# Patient Record
Sex: FEMALE | Race: WHITE | NOT HISPANIC OR LATINO | Employment: FULL TIME | URBAN - METROPOLITAN AREA
[De-identification: names, ages, dates, MRNs, and addresses within clinical notes are randomized per-mention and may not be internally consistent; named-entity substitution may affect disease eponyms.]

---

## 2023-04-19 DIAGNOSIS — F41.9 ANXIETY: Primary | ICD-10-CM

## 2023-04-19 RX ORDER — ESCITALOPRAM OXALATE 10 MG/1
10 TABLET ORAL DAILY
COMMUNITY
Start: 2016-05-04 | End: 2023-04-19 | Stop reason: SDUPTHER

## 2023-04-20 RX ORDER — ESCITALOPRAM OXALATE 10 MG/1
10 TABLET ORAL DAILY
Qty: 90 TABLET | Refills: 3 | Status: SHIPPED | OUTPATIENT
Start: 2023-04-20 | End: 2023-08-14 | Stop reason: SDUPTHER

## 2023-06-19 ENCOUNTER — OFFICE VISIT (OUTPATIENT)
Dept: PRIMARY CARE | Facility: CLINIC | Age: 43
End: 2023-06-19
Payer: COMMERCIAL

## 2023-06-19 VITALS
TEMPERATURE: 97.3 F | SYSTOLIC BLOOD PRESSURE: 128 MMHG | BODY MASS INDEX: 38.26 KG/M2 | WEIGHT: 216 LBS | OXYGEN SATURATION: 98 % | HEART RATE: 73 BPM | DIASTOLIC BLOOD PRESSURE: 86 MMHG | RESPIRATION RATE: 18 BRPM

## 2023-06-19 DIAGNOSIS — F41.9 ANXIETY: ICD-10-CM

## 2023-06-19 DIAGNOSIS — E04.9 ENLARGED THYROID: ICD-10-CM

## 2023-06-19 DIAGNOSIS — K21.9 GASTROESOPHAGEAL REFLUX DISEASE WITHOUT ESOPHAGITIS: Primary | ICD-10-CM

## 2023-06-19 DIAGNOSIS — E78.5 DYSLIPIDEMIA: ICD-10-CM

## 2023-06-19 PROBLEM — R35.0 URINARY FREQUENCY: Status: ACTIVE | Noted: 2023-06-19

## 2023-06-19 PROBLEM — E55.9 VITAMIN D DEFICIENCY: Status: ACTIVE | Noted: 2023-06-19

## 2023-06-19 PROBLEM — L30.9 DERMATITIS: Status: ACTIVE | Noted: 2023-06-19

## 2023-06-19 PROBLEM — J32.9 SINUSITIS: Status: ACTIVE | Noted: 2023-06-19

## 2023-06-19 PROBLEM — D64.9 ANEMIA: Status: ACTIVE | Noted: 2023-06-19

## 2023-06-19 PROBLEM — H66.90 OTITIS MEDIA: Status: ACTIVE | Noted: 2023-06-19

## 2023-06-19 PROBLEM — O20.9 FIRST TRIMESTER BLEEDING (HHS-HCC): Status: ACTIVE | Noted: 2023-06-19

## 2023-06-19 PROBLEM — J30.2 SEASONAL ALLERGIC RHINITIS: Status: ACTIVE | Noted: 2023-06-19

## 2023-06-19 PROBLEM — M25.559 PAINFUL HIP: Status: ACTIVE | Noted: 2023-06-19

## 2023-06-19 PROBLEM — O09.521 ELDERLY MULTIGRAVIDA IN FIRST TRIMESTER (HHS-HCC): Status: ACTIVE | Noted: 2023-06-19

## 2023-06-19 PROBLEM — R31.9 HEMATURIA: Status: ACTIVE | Noted: 2023-06-19

## 2023-06-19 PROBLEM — H10.9 CONJUNCTIVITIS: Status: ACTIVE | Noted: 2023-06-19

## 2023-06-19 PROBLEM — R53.83 FATIGUE: Status: ACTIVE | Noted: 2023-06-19

## 2023-06-19 PROBLEM — B37.31 CANDIDA VAGINITIS: Status: ACTIVE | Noted: 2023-06-19

## 2023-06-19 PROBLEM — J01.90 ACUTE SINUSITIS: Status: ACTIVE | Noted: 2023-06-19

## 2023-06-19 PROCEDURE — 99213 OFFICE O/P EST LOW 20 MIN: CPT | Performed by: INTERNAL MEDICINE

## 2023-06-19 PROCEDURE — 1036F TOBACCO NON-USER: CPT | Performed by: INTERNAL MEDICINE

## 2023-06-19 RX ORDER — LEVOTHYROXINE SODIUM 50 UG/1
50 TABLET ORAL
COMMUNITY
End: 2023-06-19

## 2023-06-19 RX ORDER — LEVOTHYROXINE SODIUM 25 UG/1
25 TABLET ORAL
COMMUNITY
Start: 2023-06-15 | End: 2023-06-19

## 2023-06-19 RX ORDER — ESOMEPRAZOLE MAGNESIUM 40 MG/1
40 CAPSULE, DELAYED RELEASE ORAL
Qty: 90 CAPSULE | Refills: 1 | Status: SHIPPED | OUTPATIENT
Start: 2023-06-19 | End: 2023-10-06

## 2023-06-19 RX ORDER — ALPRAZOLAM 0.25 MG/1
1 TABLET ORAL DAILY
COMMUNITY

## 2023-06-19 RX ORDER — ESOMEPRAZOLE MAGNESIUM 40 MG/1
1 CAPSULE, DELAYED RELEASE ORAL DAILY
COMMUNITY
Start: 2020-09-08 | End: 2023-06-19

## 2023-06-19 RX ORDER — ASPIRIN 325 MG
1 TABLET, DELAYED RELEASE (ENTERIC COATED) ORAL
COMMUNITY
Start: 2019-02-19 | End: 2023-10-06 | Stop reason: SDUPTHER

## 2023-06-19 ASSESSMENT — PATIENT HEALTH QUESTIONNAIRE - PHQ9
1. LITTLE INTEREST OR PLEASURE IN DOING THINGS: NOT AT ALL
2. FEELING DOWN, DEPRESSED OR HOPELESS: NOT AT ALL
SUM OF ALL RESPONSES TO PHQ9 QUESTIONS 1 AND 2: 0

## 2023-06-19 ASSESSMENT — PAIN SCALES - GENERAL: PAINLEVEL: 0-NO PAIN

## 2023-06-19 NOTE — PROGRESS NOTES
Subjective   Patient ID: Maia eTjada is a 43 y.o. female who presents for Follow-up (TEST RESULTS, Request referrals ).    HPI Priya presents to the office for a scheduled visit previously when I had seen her she was having some issues with swallowing problems.  Assessed her physical exam which was consistent with thyromegaly we obtained a ultrasound of thyroid which was enlarged mildly she had seen a [facility] health physician who had initiated thyroid replacement therapy with normal thyroid function and normal antibodies which had tested before    She then saw endocrinology she was told that she should not be on any replacement as her was normal and she has no evidence of any autoimmune or Hashimoto's thyroiditis.  She can stop taking the medication when she is some difficulty with swallowing and dysphagia.    No GI symptoms and mood is good    Does not use Xanax much but she has some for very significant and serious panic issues which has not been the case for some time now    Otherwise no issues    Review of Systems 10 systems reviewed does not mention were negative    Objective   /86 (BP Location: Left arm, Patient Position: Sitting)   Pulse 73   Temp 36.3 °C (97.3 °F)   Resp 18   Wt 98 kg (216 lb)   SpO2 98%   BMI 38.26 kg/m²     Physical Exam HEENT normocephalic atraumatic pupils round and reactive no oropharyngeal exudate no thyromegaly  Carotid bruits absent  Cardiovascular regular rate and rhythm no murmurs  Respiratory bilateral breath sounds without rales or rhonchi or mitral chest expansion bilaterally  Abdomen soft nontender bowel sounds are present no organomegaly  Skin warm without any rashes  Musculoskeletal no digital clubbing or cyanosis normal gait no muscle atrophy  Neuro alert and oriented Ãƒ-3. Speech clear. Follows commands appropriately  Pulse normal carotid pulse normal radial pulse normal pedal pulses      Assessment/Plan   Problem List Items Addressed This Visit           Digestive    GERD (gastroesophageal reflux disease) - Primary    Relevant Medications    esomeprazole (NexIUM) 40 mg DR capsule    Other Relevant Orders    Referral to Gastroenterology       Endocrine/Metabolic    Enlarged thyroid       Other    Anxiety    Dyslipidemia     With regards to her thyromegaly she has an enlarged thyroid which is not consistent with any evidence of Hashimoto's thyroiditis again I have worked her up she was advised again to follow my advice and in the compliance advised on not initiating replacement of thyroid medication    Dysphagia should be evaluated by GI as well GERD may be an issue here I recommended Nexium for about the next maybe 4 weeks follow-up with GI also for possible upper esophageal stenosis    We discussed diet and activity and weight management for cholesterol assessment    Anxiety is very well controlled currently with SSRI and very infrequent need for alprazolam

## 2023-08-14 DIAGNOSIS — F41.9 ANXIETY: ICD-10-CM

## 2023-08-15 RX ORDER — ESCITALOPRAM OXALATE 10 MG/1
10 TABLET ORAL DAILY
Qty: 90 TABLET | Refills: 1 | Status: SHIPPED | OUTPATIENT
Start: 2023-08-15 | End: 2024-02-12

## 2023-09-15 ENCOUNTER — APPOINTMENT (OUTPATIENT)
Dept: PRIMARY CARE | Facility: CLINIC | Age: 43
End: 2023-09-15
Payer: COMMERCIAL

## 2023-09-19 PROBLEM — R92.8 ABNORMAL MAMMOGRAM OF LEFT BREAST: Status: ACTIVE | Noted: 2023-09-19

## 2023-09-19 PROBLEM — F33.42 MDD (MAJOR DEPRESSIVE DISORDER), RECURRENT, IN FULL REMISSION (CMS-HCC): Status: ACTIVE | Noted: 2023-09-19

## 2023-09-19 PROBLEM — F41.1 GAD (GENERALIZED ANXIETY DISORDER): Status: ACTIVE | Noted: 2023-09-19

## 2023-09-19 PROBLEM — R14.0 ABDOMINAL BLOATING: Status: ACTIVE | Noted: 2023-09-19

## 2023-09-19 PROBLEM — E66.812 OBESITY, CLASS II, BMI 35-39.9: Status: ACTIVE | Noted: 2021-05-04

## 2023-09-19 PROBLEM — E66.9 OBESITY, CLASS II, BMI 35-39.9: Status: ACTIVE | Noted: 2021-05-04

## 2023-09-19 PROBLEM — F17.200 SMOKING: Status: ACTIVE | Noted: 2023-09-19

## 2023-09-19 PROBLEM — R13.10 DYSPHAGIA: Status: ACTIVE | Noted: 2023-09-19

## 2023-09-19 RX ORDER — LEVOTHYROXINE SODIUM 25 UG/1
TABLET ORAL
COMMUNITY
Start: 2023-04-20 | End: 2023-10-06 | Stop reason: ALTCHOICE

## 2023-09-19 RX ORDER — IVERMECTIN 10 MG/G
CREAM TOPICAL
COMMUNITY
Start: 2021-04-26 | End: 2023-10-06 | Stop reason: ALTCHOICE

## 2023-09-19 RX ORDER — POLYETHYLENE GLYCOL 3350 17 G/17G
POWDER, FOR SOLUTION ORAL 2 TIMES DAILY
COMMUNITY
Start: 2023-08-07

## 2023-09-19 RX ORDER — ALBUTEROL SULFATE 90 UG/1
2 AEROSOL, METERED RESPIRATORY (INHALATION) EVERY 4 HOURS PRN
COMMUNITY
Start: 2021-08-18 | End: 2023-10-06 | Stop reason: ALTCHOICE

## 2023-09-19 RX ORDER — SIMETHICONE 125 MG
2 CAPSULE ORAL 4 TIMES DAILY PRN
COMMUNITY
Start: 2023-08-07

## 2023-09-19 RX ORDER — AMOXICILLIN 250 MG
2 CAPSULE ORAL DAILY
COMMUNITY
Start: 2023-08-07 | End: 2023-10-06 | Stop reason: SINTOL

## 2023-09-20 ENCOUNTER — TELEPHONE (OUTPATIENT)
Dept: PRIMARY CARE | Facility: CLINIC | Age: 43
End: 2023-09-20
Payer: COMMERCIAL

## 2023-09-20 NOTE — TELEPHONE ENCOUNTER
Called and notified patient of lab results and DR recommendations. Patient verbalized understanding.

## 2023-10-06 ENCOUNTER — OFFICE VISIT (OUTPATIENT)
Dept: PRIMARY CARE | Facility: CLINIC | Age: 43
End: 2023-10-06
Payer: COMMERCIAL

## 2023-10-06 VITALS
WEIGHT: 210 LBS | OXYGEN SATURATION: 96 % | SYSTOLIC BLOOD PRESSURE: 130 MMHG | BODY MASS INDEX: 37.21 KG/M2 | HEART RATE: 85 BPM | HEIGHT: 63 IN | DIASTOLIC BLOOD PRESSURE: 88 MMHG

## 2023-10-06 DIAGNOSIS — E04.9 ENLARGED THYROID: ICD-10-CM

## 2023-10-06 DIAGNOSIS — K21.9 GASTROESOPHAGEAL REFLUX DISEASE WITHOUT ESOPHAGITIS: ICD-10-CM

## 2023-10-06 DIAGNOSIS — E78.5 DYSLIPIDEMIA: ICD-10-CM

## 2023-10-06 DIAGNOSIS — E66.9 OBESITY, CLASS II, BMI 35-39.9: ICD-10-CM

## 2023-10-06 DIAGNOSIS — Z00.00 ENCOUNTER FOR MEDICAL EXAMINATION TO ESTABLISH CARE: Primary | ICD-10-CM

## 2023-10-06 DIAGNOSIS — Z00.00 HEALTH MAINTENANCE EXAMINATION: ICD-10-CM

## 2023-10-06 DIAGNOSIS — R73.9 HYPERGLYCEMIA: ICD-10-CM

## 2023-10-06 DIAGNOSIS — Z13.29 THYROID DISORDER SCREENING: ICD-10-CM

## 2023-10-06 DIAGNOSIS — E55.9 VITAMIN D DEFICIENCY: ICD-10-CM

## 2023-10-06 PROCEDURE — 1036F TOBACCO NON-USER: CPT | Performed by: STUDENT IN AN ORGANIZED HEALTH CARE EDUCATION/TRAINING PROGRAM

## 2023-10-06 PROCEDURE — 99213 OFFICE O/P EST LOW 20 MIN: CPT | Performed by: STUDENT IN AN ORGANIZED HEALTH CARE EDUCATION/TRAINING PROGRAM

## 2023-10-06 RX ORDER — PANTOPRAZOLE SODIUM 40 MG/1
40 TABLET, DELAYED RELEASE ORAL DAILY
Qty: 90 TABLET | Refills: 1 | Status: SHIPPED | OUTPATIENT
Start: 2023-10-06 | End: 2023-12-05

## 2023-10-06 RX ORDER — ASPIRIN 325 MG
50000 TABLET, DELAYED RELEASE (ENTERIC COATED) ORAL
Qty: 12 CAPSULE | Refills: 1 | Status: SHIPPED | OUTPATIENT
Start: 2023-10-06

## 2023-10-06 ASSESSMENT — ENCOUNTER SYMPTOMS
DIARRHEA: 1
CHEST TIGHTNESS: 0
FATIGUE: 0
NERVOUS/ANXIOUS: 0
NAUSEA: 0
TROUBLE SWALLOWING: 0
ABDOMINAL DISTENTION: 1
NUMBNESS: 0
WEAKNESS: 0
CHOKING: 0
POLYDIPSIA: 0
SLEEP DISTURBANCE: 0
COLOR CHANGE: 0
ACTIVITY CHANGE: 0
ABDOMINAL PAIN: 0
APPETITE CHANGE: 0
CONSTIPATION: 0
PALPITATIONS: 0
UNEXPECTED WEIGHT CHANGE: 0
VOMITING: 0
APNEA: 0
SORE THROAT: 0

## 2023-10-06 ASSESSMENT — PATIENT HEALTH QUESTIONNAIRE - PHQ9
2. FEELING DOWN, DEPRESSED OR HOPELESS: NOT AT ALL
1. LITTLE INTEREST OR PLEASURE IN DOING THINGS: NOT AT ALL
SUM OF ALL RESPONSES TO PHQ9 QUESTIONS 1 AND 2: 0

## 2023-10-06 NOTE — PROGRESS NOTES
Subjective   Patient ID: Maia Tejada is a 43 y.o. female who presents for [facility] (New patient est care- former [facility] patient).  HPI 43 YOF presenting to [facility], previous PCP retired.  She has no complaints today.  Was evaluated by endocrine due to an enlarged thyroid seen on US, but has normal TSH & T4.  Endocrine recommended no thyroid supplementation at this time, patient also denies any hypothyroid symptoms.  Is supposed to have EGD/colonoscopy end of October due to history of chronic diarrhea, GERD, and a hiatal hernia.  Currently takes OTC nexium BID.  Has made lifestyle changes including no nocturnal eating, decreased caffeine consumption, and elevating head of her bed while sleeping, but still gets severe GERD symptoms if not taking nexium.    Review of Systems   Constitutional:  Negative for activity change, appetite change, fatigue and unexpected weight change.   HENT:  Negative for sore throat and trouble swallowing.    Respiratory:  Negative for apnea, choking and chest tightness.    Cardiovascular:  Negative for chest pain and palpitations.   Gastrointestinal:  Positive for abdominal distention and diarrhea. Negative for abdominal pain, constipation, nausea and vomiting.   Endocrine: Negative for cold intolerance, heat intolerance, polydipsia and polyuria.   Skin:  Negative for color change and rash.   Neurological:  Negative for weakness and numbness.   Psychiatric/Behavioral:  Negative for sleep disturbance. The patient is not nervous/anxious.        Objective   Physical Exam  Constitutional:       Appearance: Normal appearance.   HENT:      Mouth/Throat:      Mouth: Mucous membranes are moist.   Eyes:      Pupils: Pupils are equal, round, and reactive to light.   Cardiovascular:      Rate and Rhythm: Normal rate and regular rhythm.   Pulmonary:      Breath sounds: No rhonchi.   Musculoskeletal:         General: Normal range of motion.   Skin:     General: Skin is warm.    Neurological:      General: No focal deficit present.      Mental Status: She is alert and oriented to person, place, and time.   Psychiatric:         Mood and Affect: Mood normal.         Behavior: Behavior normal.         Body mass index is 37.2 kg/m².      Current Outpatient Medications:     ALPRAZolam (Xanax) 0.25 mg tablet, Take 1 tablet (0.25 mg) by mouth once daily., Disp: , Rfl:     escitalopram (Lexapro) 10 mg tablet, Take 1 tablet (10 mg) by mouth once daily., Disp: 90 tablet, Rfl: 1    multivitamin with minerals (multivitamin-iron-folic acid) tablet, Take 1 tablet by mouth once daily., Disp: , Rfl:     polyethylene glycol (Glycolax, Miralax) 17 gram/dose powder, Take by mouth twice a day., Disp: , Rfl:     simethicone (Mylicon,Gas-X) 125 mg capsule, Take 2 mg by mouth 4 times a day as needed., Disp: , Rfl:     cholecalciferol (Vitamin D-3) 1,250 mcg (50,000 unit) capsule, Take 1 capsule (50,000 Units) by mouth 1 (one) time per week., Disp: 12 capsule, Rfl: 1    pantoprazole (ProtoNix) 40 mg EC tablet, Take 1 tablet (40 mg) by mouth once daily. Do not crush, chew, or split., Disp: 90 tablet, Rfl: 1      Assessment/Plan   Diagnoses and all orders for this visit:  Encounter for medical examination to establish care  Vitamin D deficiency  -     cholecalciferol (Vitamin D-3) 1,250 mcg (50,000 unit) capsule; Take 1 capsule (50,000 Units) by mouth 1 (one) time per week.  Enlarged thyroid  Comments:  normal TSH, T4, and TPO antibody  Gastroesophageal reflux disease without esophagitis  -     pantoprazole (ProtoNix) 40 mg EC tablet; Take 1 tablet (40 mg) by mouth once daily. Do not crush, chew, or split.  Obesity, Class II, BMI 35-39.9  Dyslipidemia  -     Lipid panel; Future  Thyroid disorder screening  -     Tsh With Reflex To Free T4 If Abnormal; Future  Hyperglycemia  -     Hemoglobin A1c; Future  Health maintenance examination  -     CBC; Future  -     Comprehensive metabolic panel; Future    I saw and  evaluated the patient. I personally obtained the key and critical portions of the history and physical exam or was physically present for key and critical portions performed by the trainee. I reviewed the trainee's documentation and discussed the patient with the trainee. I agree with the trainee's medical decision making, as documented on the trainee's note.      Kelli Link, DO

## 2023-10-16 DIAGNOSIS — Z12.11 COLON CANCER SCREENING: Primary | ICD-10-CM

## 2023-10-17 ENCOUNTER — TELEPHONE (OUTPATIENT)
Dept: GASTROENTEROLOGY | Facility: CLINIC | Age: 43
End: 2023-10-17
Payer: COMMERCIAL

## 2023-10-17 DIAGNOSIS — Z12.11 COLON CANCER SCREENING: Primary | ICD-10-CM

## 2023-10-17 RX ORDER — POLYETHYLENE GLYCOL 3350, SODIUM SULFATE ANHYDROUS, SODIUM BICARBONATE, SODIUM CHLORIDE, POTASSIUM CHLORIDE 236; 22.74; 6.74; 5.86; 2.97 G/4L; G/4L; G/4L; G/4L; G/4L
4000 POWDER, FOR SOLUTION ORAL ONCE
Qty: 4000 ML | Refills: 0 | Status: SHIPPED | OUTPATIENT
Start: 2023-10-17 | End: 2023-10-17

## 2023-10-18 RX ORDER — SODIUM CHLORIDE 0.9 % (FLUSH) 0.9 %
10 SYRINGE (ML) INJECTION EVERY 12 HOURS
Status: CANCELLED | OUTPATIENT
Start: 2023-10-18

## 2023-10-18 RX ORDER — POLYETHYLENE GLYCOL 3350, SODIUM SULFATE, SODIUM CHLORIDE, POTASSIUM CHLORIDE, ASCORBIC ACID, SODIUM ASCORBATE 140-9-5.2G
115.96 KIT ORAL 2 TIMES DAILY
Qty: 1 EACH | Refills: 0 | Status: SHIPPED | OUTPATIENT
Start: 2023-10-18 | End: 2023-10-19

## 2023-10-18 RX ORDER — SODIUM CHLORIDE, SODIUM LACTATE, POTASSIUM CHLORIDE, CALCIUM CHLORIDE 600; 310; 30; 20 MG/100ML; MG/100ML; MG/100ML; MG/100ML
20 INJECTION, SOLUTION INTRAVENOUS CONTINUOUS
Status: CANCELLED | OUTPATIENT
Start: 2023-10-18

## 2023-10-18 RX ORDER — SODIUM CHLORIDE 0.9 % (FLUSH) 0.9 %
10 SYRINGE (ML) INJECTION AS NEEDED
Status: CANCELLED | OUTPATIENT
Start: 2023-10-18

## 2023-10-19 ENCOUNTER — APPOINTMENT (OUTPATIENT)
Dept: GASTROENTEROLOGY | Facility: HOSPITAL | Age: 43
End: 2023-10-19
Payer: COMMERCIAL

## 2023-10-24 ENCOUNTER — ANESTHESIA EVENT (OUTPATIENT)
Dept: GASTROENTEROLOGY | Facility: EXTERNAL LOCATION | Age: 43
End: 2023-10-24

## 2023-10-30 ENCOUNTER — HOSPITAL ENCOUNTER (OUTPATIENT)
Dept: GASTROENTEROLOGY | Facility: EXTERNAL LOCATION | Age: 43
Discharge: HOME | End: 2023-10-30
Payer: COMMERCIAL

## 2023-10-30 ENCOUNTER — ANESTHESIA (OUTPATIENT)
Dept: GASTROENTEROLOGY | Facility: EXTERNAL LOCATION | Age: 43
End: 2023-10-30

## 2023-10-30 VITALS
WEIGHT: 198 LBS | DIASTOLIC BLOOD PRESSURE: 91 MMHG | RESPIRATION RATE: 19 BRPM | SYSTOLIC BLOOD PRESSURE: 141 MMHG | HEIGHT: 63 IN | TEMPERATURE: 97.9 F | HEART RATE: 73 BPM | OXYGEN SATURATION: 99 % | BODY MASS INDEX: 35.08 KG/M2

## 2023-10-30 DIAGNOSIS — R13.10 DYSPHAGIA, UNSPECIFIED TYPE: ICD-10-CM

## 2023-10-30 DIAGNOSIS — Z12.11 SPECIAL SCREENING FOR MALIGNANT NEOPLASM OF COLON: ICD-10-CM

## 2023-10-30 PROCEDURE — 88305 TISSUE EXAM BY PATHOLOGIST: CPT | Performed by: STUDENT IN AN ORGANIZED HEALTH CARE EDUCATION/TRAINING PROGRAM

## 2023-10-30 PROCEDURE — 45380 COLONOSCOPY AND BIOPSY: CPT | Performed by: STUDENT IN AN ORGANIZED HEALTH CARE EDUCATION/TRAINING PROGRAM

## 2023-10-30 PROCEDURE — 43239 EGD BIOPSY SINGLE/MULTIPLE: CPT | Performed by: STUDENT IN AN ORGANIZED HEALTH CARE EDUCATION/TRAINING PROGRAM

## 2023-10-30 PROCEDURE — 88305 TISSUE EXAM BY PATHOLOGIST: CPT

## 2023-10-30 RX ORDER — PROPOFOL 10 MG/ML
INJECTION, EMULSION INTRAVENOUS AS NEEDED
Status: DISCONTINUED | OUTPATIENT
Start: 2023-10-30 | End: 2023-10-30

## 2023-10-30 RX ORDER — SODIUM CHLORIDE 9 MG/ML
20 INJECTION, SOLUTION INTRAVENOUS CONTINUOUS
Status: DISCONTINUED | OUTPATIENT
Start: 2023-10-30 | End: 2023-10-31 | Stop reason: HOSPADM

## 2023-10-30 RX ORDER — LIDOCAINE HYDROCHLORIDE 20 MG/ML
INJECTION, SOLUTION INFILTRATION; PERINEURAL AS NEEDED
Status: DISCONTINUED | OUTPATIENT
Start: 2023-10-30 | End: 2023-10-30

## 2023-10-30 RX ADMIN — PROPOFOL 50 MG: 10 INJECTION, EMULSION INTRAVENOUS at 10:20

## 2023-10-30 RX ADMIN — PROPOFOL 100 MG: 10 INJECTION, EMULSION INTRAVENOUS at 10:09

## 2023-10-30 RX ADMIN — PROPOFOL 50 MG: 10 INJECTION, EMULSION INTRAVENOUS at 10:15

## 2023-10-30 RX ADMIN — PROPOFOL 50 MG: 10 INJECTION, EMULSION INTRAVENOUS at 10:32

## 2023-10-30 RX ADMIN — PROPOFOL 100 MG: 10 INJECTION, EMULSION INTRAVENOUS at 10:11

## 2023-10-30 RX ADMIN — SODIUM CHLORIDE: 9 INJECTION, SOLUTION INTRAVENOUS at 10:03

## 2023-10-30 RX ADMIN — LIDOCAINE HYDROCHLORIDE 5 ML: 20 INJECTION, SOLUTION INFILTRATION; PERINEURAL at 10:09

## 2023-10-30 RX ADMIN — PROPOFOL 50 MG: 10 INJECTION, EMULSION INTRAVENOUS at 10:37

## 2023-10-30 RX ADMIN — PROPOFOL 50 MG: 10 INJECTION, EMULSION INTRAVENOUS at 10:26

## 2023-10-30 RX ADMIN — PROPOFOL 50 MG: 10 INJECTION, EMULSION INTRAVENOUS at 10:17

## 2023-10-30 SDOH — HEALTH STABILITY: MENTAL HEALTH: CURRENT SMOKER: 1

## 2023-10-30 ASSESSMENT — PAIN SCALES - GENERAL
PAINLEVEL_OUTOF10: 0 - NO PAIN
PAIN_LEVEL: 0

## 2023-10-30 ASSESSMENT — PAIN - FUNCTIONAL ASSESSMENT
PAIN_FUNCTIONAL_ASSESSMENT: 0-10

## 2023-10-30 ASSESSMENT — COLUMBIA-SUICIDE SEVERITY RATING SCALE - C-SSRS
2. HAVE YOU ACTUALLY HAD ANY THOUGHTS OF KILLING YOURSELF?: NO
6. HAVE YOU EVER DONE ANYTHING, STARTED TO DO ANYTHING, OR PREPARED TO DO ANYTHING TO END YOUR LIFE?: NO
1. IN THE PAST MONTH, HAVE YOU WISHED YOU WERE DEAD OR WISHED YOU COULD GO TO SLEEP AND NOT WAKE UP?: NO

## 2023-10-30 NOTE — SIGNIFICANT EVENT
Physician at bedside to discuss procedure results with patient  Family updated via phone  Tolerating PO fluids

## 2023-10-30 NOTE — H&P
Outpatient Hospital Procedure H&P    Patient Profile-Procedures  Name Maia Tejada  Date of Birth 1980  MRN [id number]  Address   [address]  [place] [id number]  [place] [address]    Primary Phone Number [phone]  Secondary Phone Number    Jane Martin    Procedure(s):  EGD/Colonoscopy.  Primary contact name and number   Extended Emergency Contact Information  Primary Emergency Contact: Rojelio Tejada  Home Phone: [phone]  Relation: Spouse    General Health  Weight   Vitals:    10/30/23 0956   Weight: 89.8 kg (198 lb)     BMI Body mass index is 35.07 kg/m².    Allergies  No Known Allergies    Past Medical History   Past Medical History:   Diagnosis Date    Anxiety disorder, unspecified 2016    Anxiety    Encounter for gynecological examination (general) (routine) without abnormal findings     Pap smear, as part of routine gynecological examination    GERD (gastroesophageal reflux disease)     Personal history of diseases of the skin and subcutaneous tissue 2018    History of dermatitis    Personal history of other complications of pregnancy, childbirth and the puerperium     History of spontaneous     Personal history of other diseases of the respiratory system 2022    History of sinusitis    Personal history of other infectious and parasitic diseases     History of varicella    Personal history of other specified conditions     History of heartburn       Provider assessment  Diagnosis: GERD, dysphagia, bloating, colon cancer screening.  Medication Reviewed - yes  Prior to Admission medications    Medication Sig Start Date End Date Taking? Authorizing Provider   cholecalciferol (Vitamin D-3) 1,250 mcg (50,000 unit) capsule Take 1 capsule (50,000 Units) by mouth 1 (one) time per week. 10/6/23  Yes Kelli Link, DO   escitalopram (Lexapro) 10 mg tablet Take 1 tablet (10 mg) by mouth once daily. 8/15/23  Yes Kelli Link, DO   pantoprazole (ProtoNix)  40 mg EC tablet Take 1 tablet (40 mg) by mouth once daily. Do not crush, chew, or split. 10/6/23 12/5/23 Yes Kelli Link,    polyethylene glycol (Glycolax, Miralax) 17 gram/dose powder Take by mouth twice a day. 8/7/23  Yes Historical Provider, MD   simethicone (Mylicon,Gas-X) 125 mg capsule Take 2 mg by mouth 4 times a day as needed. 8/7/23  Yes Historical Provider, MD   ALPRAZolam (Xanax) 0.25 mg tablet Take 1 tablet (0.25 mg) by mouth once daily.    Historical Provider, MD   multivitamin with minerals (multivitamin-iron-folic acid) tablet Take 1 tablet by mouth once daily.    Historical Provider, MD       Physical Exam  Vitals:    10/30/23 0956   BP: 143/82   Pulse: 85   Resp: 22   Temp: 36.7 °C (98.1 °F)   SpO2: 96%        General: A&Ox3, NAD.  HEENT: AT/NC.   CV: RRR. No murmur.  Resp: CTA bilaterally. No wheezing, rhonchi or rales.   GI: Soft, NT/ND. BSx4.  Extrem: No edema. Pulses intact.  Skin: No Jaundice.   Neuro: No focal deficits.   Psych: Normal mood and affect.        Oropharyngeal Classification II (hard and soft palate, upper portion of tonsils anduvula visible)  ASA PS Classification 2  Sedation Plan: Deep Sedation.  Procedure Plan - pre-procedural (re)assesment completed by physician:  discharge/transfer patient when discharge criteria met    David Wayne DO  10/30/2023 9:59 AM

## 2023-10-30 NOTE — ANESTHESIA PREPROCEDURE EVALUATION
Patient: Priya eTjada    Procedure Information       Date/Time: 10/30/23 1030    Scheduled providers: David Wayne DO    Procedures:       COLONOSCOPY      EGD    Location: [facility]            Relevant Problems   Endocrine   (+) Enlarged thyroid      GI   (+) GERD (gastroesophageal reflux disease)      Neuro/Psych   (+) Anxiety   (+) MARTÍNEZ (generalized anxiety disorder)   (+) MDD (major depressive disorder), recurrent, in full remission (CMS/HCC)      Hematology   (+) Anemia      Infectious Disease   (+) Candida vaginitis       Clinical information reviewed:   Tobacco  Allergies  Meds  Problems  Med Hx  Surg Hx   Fam Hx  Soc   Hx        NPO Detail:  No data recorded     Physical Exam    Airway  Mallampati: II  TM distance: <3 FB  Neck ROM: full     Cardiovascular - normal exam  Rhythm: regular  Rate: normal     Dental - normal exam     Pulmonary - normal exam  Breath sounds clear to auscultation     Abdominal   (+) obese  Abdomen: soft         Anesthesia Plan    ASA 2     MAC     The patient is a current smoker.  Patient was previously instructed to abstain from smoking on day of procedure.  Patient did not smoke on day of procedure.  Education provided regarding risk of obstructive sleep apnea.  intravenous induction   Anesthetic plan and risks discussed with patient.  Use of blood products discussed with who consented to blood products.    Plan discussed with CRNA.

## 2023-10-30 NOTE — DISCHARGE INSTRUCTIONS
The anesthetics, sedatives and pain killers which were given to you will be acting in your body for the next 24 hours. This may cause you to feel sleepy. This feeling will slowly wear off. For the next 24 hours. Resume to normal activities tomorrow  FOR THE REST OF TODAY, DO NOT:  Drive a car  Operate machinery or power tools  Drink any form of alcohol, beer or wine.  Do not take any over the counter medications with alcohol in them or that can make you groggy  Make any important decisions or sign and legal documents.    **You may eat anything as long as your physician has not warned you to stay away from certain foods. However, it is better to start with liquids,  then progress to softer foods, and gradually work up to solid foods.    **We strongly suggest that a responsible adult be with you for the rest of the day and also the night. This is for your protection and safety since you may not be as alert as usual. You should be especially careful climbing stairs.     **If you experience bleeding in your spit or stool, fever and chills, shortness of breath, chest pain, or extreme abdominal pain, uncontrollable nausea and vomiting go to the nearest Emergency Room.    **Resume home medications unless otherwise instructed      [facility] Phone Number [phone]   [facility] [phone] If you need to speak with a doctor or it's after 5pm on a weekday or all weekend

## 2023-10-30 NOTE — ANESTHESIA POSTPROCEDURE EVALUATION
Patient: Priya Tejada    Procedure Summary       Date: 10/30/23 Room / Location: [facility]    Anesthesia Start: 1003 Anesthesia Stop: 1043    Procedures:       COLONOSCOPY      EGD Diagnosis:       Special screening for malignant neoplasm of colon      Dysphagia, unspecified type    Scheduled Providers: David Wayne DO Responsible Provider: RUDY Knutson    Anesthesia Type: MAC ASA Status: 2            Anesthesia Type: MAC    Vitals Value Taken Time   /73 10/30/23 1043   Temp 36 10/30/23 1043   Pulse 84 10/30/23 1043   Resp 18 10/30/23 1043   ` 97 10/30/23 1043       Anesthesia Post Evaluation    Patient location during evaluation: bedside  Patient participation: complete - patient participated  Level of consciousness: awake  Pain score: 0  Pain management: adequate  Airway patency: patent  Cardiovascular status: acceptable  Respiratory status: acceptable  Hydration status: acceptable      There were no known notable events for this encounter.

## 2023-11-07 ENCOUNTER — APPOINTMENT (OUTPATIENT)
Dept: GASTROENTEROLOGY | Facility: EXTERNAL LOCATION | Age: 43
End: 2023-11-07
Payer: COMMERCIAL

## 2023-11-11 LAB
LABORATORY COMMENT REPORT: NORMAL
PATH REPORT.FINAL DX SPEC: NORMAL
PATH REPORT.GROSS SPEC: NORMAL
PATH REPORT.TOTAL CANCER: NORMAL

## 2023-12-04 ENCOUNTER — OFFICE VISIT (OUTPATIENT)
Dept: GASTROENTEROLOGY | Facility: CLINIC | Age: 43
End: 2023-12-04
Payer: COMMERCIAL

## 2023-12-04 VITALS
HEIGHT: 63 IN | SYSTOLIC BLOOD PRESSURE: 146 MMHG | WEIGHT: 212 LBS | DIASTOLIC BLOOD PRESSURE: 89 MMHG | HEART RATE: 86 BPM | BODY MASS INDEX: 37.56 KG/M2

## 2023-12-04 DIAGNOSIS — K58.9 IRRITABLE BOWEL SYNDROME, UNSPECIFIED TYPE: ICD-10-CM

## 2023-12-04 DIAGNOSIS — R09.A2 GLOBUS SENSATION: ICD-10-CM

## 2023-12-04 DIAGNOSIS — Z11.59 ENCOUNTER FOR SCREENING FOR OTHER VIRAL DISEASES: Primary | ICD-10-CM

## 2023-12-04 PROCEDURE — 99214 OFFICE O/P EST MOD 30 MIN: CPT | Performed by: STUDENT IN AN ORGANIZED HEALTH CARE EDUCATION/TRAINING PROGRAM

## 2023-12-04 PROCEDURE — 1036F TOBACCO NON-USER: CPT | Performed by: STUDENT IN AN ORGANIZED HEALTH CARE EDUCATION/TRAINING PROGRAM

## 2023-12-04 NOTE — PROGRESS NOTES
8/2023  43-year-old lady with history of anxiety, dyslipidemia, thyromegaly, GERD for over 20 years presenting with multiple GI complaints. The patient mentions having chronic feeling of tightness in her throat happening mainly after solid food not with liquids, occurs at the same time with heartburn, lasts for hours, gets better on its own, sometimes not related to food as well. It can happen with acid and nonacid food. Her primary doctor put her on Nexium which helped with the heartburn but not with the tightness. She also complains of chronic abdominal bloating with incomplete bowel emptying and 4 soft back-to-back bowel movements, mention prior constipation with 1 bowel movement every few days with straining. She mentioned that she was placed on Synthroid but currently stopped it.     12/4/2023  Patient is here for follow-up, status post EGD and colonoscopy as below, felt better after colonoscopy, denies any dysphagia but mentioned that she is aware of her swallowing    EGD 7 years ago showing heartburn hiatal hernia esophageal ulcer as per her  EGD 10/2023 small hiatal hernia, gastritis, biopsy of the esophagus stomach and duodenum unremarkable  Colonoscopy 7 years ago showing hemorrhoids as per her  Colonoscopy 10/2023 hemorrhoids, good prep, repeat in 10 years, biopsy unremarkable  Family history reviewed, not pertinent to chief complaint  Bowel movements as above  Denies NSAIDs, social alcohol use, denies marijuana drug use, positive smoker     1-born in 1980, screen for hepatitis C     2-BMI 37, healthy lifestyle advised     3-Healthcare maintenance, colonoscopy as above, repeat in 2033 after 10 days of twice daily MiraLAX, note that patient felt better after last colonoscopy bloating wise     4-heartburn and globus/tightness feeling in the throat after meals, history of thyromegaly, CT coronary 7/2023 showing small hiatal hernia see report,  Nexium to twice a day, lifestyle changes advised, x-ray  esophagogram 8/2023 unremarkable, status post EGD as above showing small hiatal hernia, biopsy of the esophagus stomach and duodenum unremarkable, if symptoms recur can consider esophageal motility and pH study, advised to avoid stress, suspect additional functional component     5-incomplete bowel emptying, felt better after colonoscopy, can consider senna Colace as needed, Gas-X as needed     6-smoking cessation advised, 3 minutes

## 2023-12-04 NOTE — PATIENT INSTRUCTIONS
1-your next colonoscopy is recommended in 10 years unless indicated earlier otherwise  2-for the heartburn please continue Nexium to 40 mg twice a day half an hour before food, please elevate the head of the bed 6 to 8 inches, have an early dinner at least 3 hours before sleep, have small frequent meals, avoid lying down after meals, avoid spices juices soda tomatoes hoang oranges caffeine, other caffeinated beverages, chocolate alcohol NSAIDs smoking fatty food peppermint among others  3-to help with your bowel movement please increase fiber intake water intake and physical activity as tolerated  4-good job following a healthy diet and trying to stay active and quit smoking  5-we need to screen for hepatitis C with a blood test

## 2024-02-12 DIAGNOSIS — F41.9 ANXIETY: ICD-10-CM

## 2024-02-12 RX ORDER — ESCITALOPRAM OXALATE 10 MG/1
10 TABLET ORAL DAILY
Qty: 90 TABLET | Refills: 0 | Status: SHIPPED | OUTPATIENT
Start: 2024-02-12 | End: 2024-05-11

## 2024-02-27 ENCOUNTER — LAB (OUTPATIENT)
Dept: LAB | Facility: LAB | Age: 44
End: 2024-02-27
Payer: COMMERCIAL

## 2024-02-27 DIAGNOSIS — Z13.29 THYROID DISORDER SCREENING: ICD-10-CM

## 2024-02-27 DIAGNOSIS — R73.9 HYPERGLYCEMIA: ICD-10-CM

## 2024-02-27 DIAGNOSIS — Z00.00 HEALTH MAINTENANCE EXAMINATION: ICD-10-CM

## 2024-02-27 DIAGNOSIS — E78.5 DYSLIPIDEMIA: ICD-10-CM

## 2024-02-27 DIAGNOSIS — Z11.59 ENCOUNTER FOR SCREENING FOR OTHER VIRAL DISEASES: ICD-10-CM

## 2024-02-27 LAB
ALBUMIN SERPL BCP-MCNC: 4.3 G/DL (ref 3.4–5)
ALP SERPL-CCNC: 59 U/L (ref 33–110)
ALT SERPL W P-5'-P-CCNC: 14 U/L (ref 7–45)
ANION GAP SERPL CALC-SCNC: 13 MMOL/L (ref 10–20)
AST SERPL W P-5'-P-CCNC: 14 U/L (ref 9–39)
BILIRUB SERPL-MCNC: 0.7 MG/DL (ref 0–1.2)
BUN SERPL-MCNC: 8 MG/DL (ref 6–23)
CALCIUM SERPL-MCNC: 9.6 MG/DL (ref 8.6–10.6)
CHLORIDE SERPL-SCNC: 105 MMOL/L (ref 98–107)
CHOLEST SERPL-MCNC: 204 MG/DL (ref 0–199)
CHOLESTEROL/HDL RATIO: 4.3
CO2 SERPL-SCNC: 26 MMOL/L (ref 21–32)
CREAT SERPL-MCNC: 0.51 MG/DL (ref 0.5–1.05)
EGFRCR SERPLBLD CKD-EPI 2021: >90 ML/MIN/1.73M*2
ERYTHROCYTE [DISTWIDTH] IN BLOOD BY AUTOMATED COUNT: 13.5 % (ref 11.5–14.5)
EST. AVERAGE GLUCOSE BLD GHB EST-MCNC: 100 MG/DL
GLUCOSE SERPL-MCNC: 98 MG/DL (ref 74–99)
HBA1C MFR BLD: 5.1 %
HCT VFR BLD AUTO: 42.3 % (ref 36–46)
HCV AB SER QL: NONREACTIVE
HDLC SERPL-MCNC: 47.9 MG/DL
HGB BLD-MCNC: 13.6 G/DL (ref 12–16)
LDLC SERPL CALC-MCNC: 137 MG/DL
MCH RBC QN AUTO: 30.3 PG (ref 26–34)
MCHC RBC AUTO-ENTMCNC: 32.2 G/DL (ref 32–36)
MCV RBC AUTO: 94 FL (ref 80–100)
NON HDL CHOLESTEROL: 156 MG/DL (ref 0–149)
NRBC BLD-RTO: 0 /100 WBCS (ref 0–0)
PLATELET # BLD AUTO: 263 X10*3/UL (ref 150–450)
POTASSIUM SERPL-SCNC: 4.1 MMOL/L (ref 3.5–5.3)
PROT SERPL-MCNC: 7.4 G/DL (ref 6.4–8.2)
RBC # BLD AUTO: 4.49 X10*6/UL (ref 4–5.2)
SODIUM SERPL-SCNC: 140 MMOL/L (ref 136–145)
TRIGL SERPL-MCNC: 95 MG/DL (ref 0–149)
TSH SERPL-ACNC: 2.17 MIU/L (ref 0.44–3.98)
VLDL: 19 MG/DL (ref 0–40)
WBC # BLD AUTO: 5.4 X10*3/UL (ref 4.4–11.3)

## 2024-02-27 PROCEDURE — 83036 HEMOGLOBIN GLYCOSYLATED A1C: CPT

## 2024-02-27 PROCEDURE — 80061 LIPID PANEL: CPT

## 2024-02-27 PROCEDURE — 84443 ASSAY THYROID STIM HORMONE: CPT

## 2024-02-27 PROCEDURE — 80053 COMPREHEN METABOLIC PANEL: CPT

## 2024-02-27 PROCEDURE — 86803 HEPATITIS C AB TEST: CPT

## 2024-02-27 PROCEDURE — 85027 COMPLETE CBC AUTOMATED: CPT

## 2024-02-27 PROCEDURE — 36415 COLL VENOUS BLD VENIPUNCTURE: CPT

## 2024-05-11 DIAGNOSIS — F41.9 ANXIETY: ICD-10-CM

## 2024-05-11 RX ORDER — ESCITALOPRAM OXALATE 10 MG/1
10 TABLET ORAL DAILY
Qty: 90 TABLET | Refills: 0 | Status: SHIPPED | OUTPATIENT
Start: 2024-05-11

## 2024-07-23 DIAGNOSIS — F41.9 ANXIETY: ICD-10-CM

## 2024-07-23 RX ORDER — ESCITALOPRAM OXALATE 20 MG/1
20 TABLET ORAL DAILY
Qty: 90 TABLET | Refills: 1 | Status: SHIPPED | OUTPATIENT
Start: 2024-07-23

## 2024-07-23 RX ORDER — ALPRAZOLAM 0.25 MG/1
0.25 TABLET ORAL DAILY
Qty: 15 TABLET | Refills: 0 | Status: SHIPPED | OUTPATIENT
Start: 2024-07-23

## 2024-07-23 NOTE — PROGRESS NOTES
"Subjective   Patient ID: Maia Tejada \"Priya\" is a 44 y.o. female who presents for No chief complaint on file..  HPI    Review of Systems    Objective   Physical Exam    Assessment/Plan            Kelli Link DO 07/23/24 12:51 PM   "

## 2024-08-26 DIAGNOSIS — E55.9 VITAMIN D DEFICIENCY: Primary | ICD-10-CM

## 2024-10-07 ENCOUNTER — APPOINTMENT (OUTPATIENT)
Dept: PRIMARY CARE | Facility: CLINIC | Age: 44
End: 2024-10-07
Payer: COMMERCIAL

## 2024-10-07 VITALS
OXYGEN SATURATION: 98 % | DIASTOLIC BLOOD PRESSURE: 80 MMHG | BODY MASS INDEX: 32.78 KG/M2 | HEART RATE: 78 BPM | WEIGHT: 185 LBS | SYSTOLIC BLOOD PRESSURE: 120 MMHG | HEIGHT: 63 IN

## 2024-10-07 DIAGNOSIS — E04.9 ENLARGED THYROID: ICD-10-CM

## 2024-10-07 DIAGNOSIS — Z00.00 ANNUAL PHYSICAL EXAM: Primary | ICD-10-CM

## 2024-10-07 DIAGNOSIS — R73.9 HYPERGLYCEMIA: ICD-10-CM

## 2024-10-07 DIAGNOSIS — J31.0 CHRONIC RHINITIS: Chronic | ICD-10-CM

## 2024-10-07 DIAGNOSIS — F41.9 ANXIETY: Chronic | ICD-10-CM

## 2024-10-07 DIAGNOSIS — Z13.220 LIPID SCREENING: ICD-10-CM

## 2024-10-07 DIAGNOSIS — Z13.29 THYROID DISORDER SCREENING: ICD-10-CM

## 2024-10-07 DIAGNOSIS — K21.9 GASTROESOPHAGEAL REFLUX DISEASE, UNSPECIFIED WHETHER ESOPHAGITIS PRESENT: Chronic | ICD-10-CM

## 2024-10-07 LAB
ALBUMIN SERPL BCP-MCNC: 4.3 G/DL (ref 3.4–5)
ALP SERPL-CCNC: 66 U/L (ref 33–110)
ALT SERPL W P-5'-P-CCNC: 14 U/L (ref 7–45)
ANION GAP SERPL CALC-SCNC: 14 MMOL/L (ref 10–20)
AST SERPL W P-5'-P-CCNC: 15 U/L (ref 9–39)
BILIRUB SERPL-MCNC: 0.5 MG/DL (ref 0–1.2)
BUN SERPL-MCNC: 8 MG/DL (ref 6–23)
CALCIUM SERPL-MCNC: 9.3 MG/DL (ref 8.6–10.6)
CHLORIDE SERPL-SCNC: 102 MMOL/L (ref 98–107)
CHOLEST SERPL-MCNC: 216 MG/DL (ref 0–199)
CHOLESTEROL/HDL RATIO: 5
CO2 SERPL-SCNC: 27 MMOL/L (ref 21–32)
CREAT SERPL-MCNC: 0.48 MG/DL (ref 0.5–1.05)
EGFRCR SERPLBLD CKD-EPI 2021: >90 ML/MIN/1.73M*2
ERYTHROCYTE [DISTWIDTH] IN BLOOD BY AUTOMATED COUNT: 13 % (ref 11.5–14.5)
EST. AVERAGE GLUCOSE BLD GHB EST-MCNC: 105 MG/DL
GLUCOSE SERPL-MCNC: 83 MG/DL (ref 74–99)
HBA1C MFR BLD: 5.3 %
HCT VFR BLD AUTO: 39.5 % (ref 36–46)
HDLC SERPL-MCNC: 43.1 MG/DL
HGB BLD-MCNC: 12.6 G/DL (ref 12–16)
LDLC SERPL CALC-MCNC: 161 MG/DL
MCH RBC QN AUTO: 30.2 PG (ref 26–34)
MCHC RBC AUTO-ENTMCNC: 31.9 G/DL (ref 32–36)
MCV RBC AUTO: 95 FL (ref 80–100)
NON HDL CHOLESTEROL: 173 MG/DL (ref 0–149)
NRBC BLD-RTO: 0 /100 WBCS (ref 0–0)
PLATELET # BLD AUTO: 277 X10*3/UL (ref 150–450)
POTASSIUM SERPL-SCNC: 4.2 MMOL/L (ref 3.5–5.3)
PROT SERPL-MCNC: 7.3 G/DL (ref 6.4–8.2)
RBC # BLD AUTO: 4.17 X10*6/UL (ref 4–5.2)
SODIUM SERPL-SCNC: 139 MMOL/L (ref 136–145)
TRIGL SERPL-MCNC: 62 MG/DL (ref 0–149)
TSH SERPL-ACNC: 1.21 MIU/L (ref 0.44–3.98)
VLDL: 12 MG/DL (ref 0–40)
WBC # BLD AUTO: 5.3 X10*3/UL (ref 4.4–11.3)

## 2024-10-07 PROCEDURE — 3008F BODY MASS INDEX DOCD: CPT | Performed by: STUDENT IN AN ORGANIZED HEALTH CARE EDUCATION/TRAINING PROGRAM

## 2024-10-07 PROCEDURE — 1036F TOBACCO NON-USER: CPT | Performed by: STUDENT IN AN ORGANIZED HEALTH CARE EDUCATION/TRAINING PROGRAM

## 2024-10-07 PROCEDURE — 80053 COMPREHEN METABOLIC PANEL: CPT

## 2024-10-07 PROCEDURE — 85027 COMPLETE CBC AUTOMATED: CPT

## 2024-10-07 PROCEDURE — 80061 LIPID PANEL: CPT

## 2024-10-07 PROCEDURE — 99396 PREV VISIT EST AGE 40-64: CPT | Performed by: STUDENT IN AN ORGANIZED HEALTH CARE EDUCATION/TRAINING PROGRAM

## 2024-10-07 PROCEDURE — 83036 HEMOGLOBIN GLYCOSYLATED A1C: CPT

## 2024-10-07 PROCEDURE — 84443 ASSAY THYROID STIM HORMONE: CPT

## 2024-10-07 RX ORDER — ESCITALOPRAM OXALATE 20 MG/1
20 TABLET ORAL DAILY
Qty: 90 TABLET | Refills: 1 | Status: SHIPPED | OUTPATIENT
Start: 2024-10-07

## 2024-10-07 ASSESSMENT — PROMIS GLOBAL HEALTH SCALE
CARRYOUT_PHYSICAL_ACTIVITIES: COMPLETELY
RATE_QUALITY_OF_LIFE: VERY GOOD
RATE_MENTAL_HEALTH: GOOD
CARRYOUT_SOCIAL_ACTIVITIES: GOOD
EMOTIONAL_PROBLEMS: ALWAYS
RATE_AVERAGE_FATIGUE: MODERATE
RATE_AVERAGE_PAIN: 4
RATE_PHYSICAL_HEALTH: VERY GOOD
RATE_GENERAL_HEALTH: GOOD
RATE_SOCIAL_SATISFACTION: VERY GOOD

## 2024-10-07 ASSESSMENT — ENCOUNTER SYMPTOMS
SLEEP DISTURBANCE: 0
DIZZINESS: 0
ABDOMINAL PAIN: 0
DIFFICULTY URINATING: 0
PALPITATIONS: 0
SINUS PRESSURE: 0
CONSTIPATION: 0
FATIGUE: 0
SHORTNESS OF BREATH: 0
DIARRHEA: 0
NERVOUS/ANXIOUS: 0
HEADACHES: 0
WHEEZING: 0
UNEXPECTED WEIGHT CHANGE: 0
CHEST TIGHTNESS: 0
DYSPHORIC MOOD: 0
LIGHT-HEADEDNESS: 0

## 2024-10-07 ASSESSMENT — PATIENT HEALTH QUESTIONNAIRE - PHQ9
1. LITTLE INTEREST OR PLEASURE IN DOING THINGS: NOT AT ALL
SUM OF ALL RESPONSES TO PHQ9 QUESTIONS 1 AND 2: 0
2. FEELING DOWN, DEPRESSED OR HOPELESS: NOT AT ALL

## 2024-10-07 NOTE — PROGRESS NOTES
"Subjective   Patient ID: Maia Tejada \"Priya\" is a 44 y.o. female who presents for Annual Exam (Physical /Would like to address anxiety and heartburn).  Feeling a lot better on 15mg lexapro dose. Trying to stay more in tune with her body.     Stopped drinking in July. Quit smoking.     Still noticing heartburn even with stopping drinking. Takes nexium sporadically. Wants to know if she should take this daily.     Significant mucus draining down her throat. Wants to know what she should try for this.     Periods are monthly but ranging 2-9 days long. Having perimenopause symptoms including hot flashes.     Exercising every day. Overall feeling well.     Up to date on mammogram.     Due for labs.        Review of Systems   Constitutional:  Negative for fatigue and unexpected weight change.   HENT:  Negative for congestion, ear pain and sinus pressure.    Eyes:  Negative for visual disturbance.   Respiratory:  Negative for chest tightness, shortness of breath and wheezing.    Cardiovascular:  Negative for chest pain, palpitations and leg swelling.   Gastrointestinal:  Negative for abdominal pain, constipation and diarrhea.   Genitourinary:  Negative for difficulty urinating.   Skin:  Negative for rash.   Neurological:  Negative for dizziness, light-headedness and headaches.   Psychiatric/Behavioral:  Negative for dysphoric mood and sleep disturbance. The patient is not nervous/anxious.    All other systems reviewed and are negative.      Objective   Physical Exam  Vitals reviewed.   Constitutional:       General: She is not in acute distress.  HENT:      Head: Normocephalic.      Right Ear: External ear normal.      Left Ear: External ear normal.      Nose: Nose normal.   Eyes:      Extraocular Movements: Extraocular movements intact.      Pupils: Pupils are equal, round, and reactive to light.   Cardiovascular:      Rate and Rhythm: Normal rate and regular rhythm.      Heart sounds: Normal heart sounds. "   Pulmonary:      Effort: Pulmonary effort is normal.      Breath sounds: Normal breath sounds.   Abdominal:      Palpations: Abdomen is soft.      Tenderness: There is no abdominal tenderness. There is no guarding.   Musculoskeletal:      Cervical back: Normal range of motion and neck supple.   Skin:     General: Skin is warm and dry.   Neurological:      Mental Status: She is alert. Mental status is at baseline.   Psychiatric:         Mood and Affect: Mood normal.         Behavior: Behavior normal.         Body mass index is 32.77 kg/m².      Current Outpatient Medications:     ALPRAZolam (Xanax) 0.25 mg tablet, Take 1 tablet (0.25 mg) by mouth once daily., Disp: 15 tablet, Rfl: 0    multivitamin with minerals (multivitamin-iron-folic acid) tablet, Take 1 tablet by mouth once daily., Disp: , Rfl:     escitalopram (Lexapro) 20 mg tablet, Take 1 tablet (20 mg) by mouth once daily., Disp: 90 tablet, Rfl: 1      Assessment/Plan   Diagnoses and all orders for this visit:  Annual physical exam  Comments:  UTD on mammogram  screening labs ordered  UTD on pap  Orders:  -     Comprehensive Metabolic Panel  -     CBC  Anxiety  Comments:  doing well on lexparo 15mg  uses alprazolam sparingly  Orders:  -     escitalopram (Lexapro) 20 mg tablet; Take 1 tablet (20 mg) by mouth once daily.  Lipid screening  -     Lipid Panel  Thyroid disorder screening  -     TSH with reflex to Free T4 if abnormal  Hyperglycemia  -     Hemoglobin A1c  Enlarged thyroid  -     US thyroid; Future  Gastroesophageal reflux disease, unspecified whether esophagitis present  Comments:  start PPI daily  Chronic rhinitis  Comments:  recommend adding daily flonase  also has ENT appt    Follow up annually or sooner as needed       Kelli Link DO 10/07/24 8:58 AM

## 2024-10-09 ENCOUNTER — APPOINTMENT (OUTPATIENT)
Dept: RADIOLOGY | Facility: CLINIC | Age: 44
End: 2024-10-09
Payer: COMMERCIAL

## 2024-10-11 ENCOUNTER — HOSPITAL ENCOUNTER (OUTPATIENT)
Dept: RADIOLOGY | Facility: CLINIC | Age: 44
Discharge: HOME | End: 2024-10-11
Payer: COMMERCIAL

## 2024-10-11 DIAGNOSIS — E04.9 ENLARGED THYROID: ICD-10-CM

## 2024-10-11 PROCEDURE — 76536 US EXAM OF HEAD AND NECK: CPT

## 2024-10-16 DIAGNOSIS — E55.9 VITAMIN D DEFICIENCY: Primary | ICD-10-CM

## 2024-10-16 RX ORDER — ERGOCALCIFEROL 1.25 MG/1
50000 CAPSULE ORAL WEEKLY
Qty: 12 CAPSULE | Refills: 3 | Status: SHIPPED | OUTPATIENT
Start: 2024-10-16

## 2024-10-16 NOTE — PROGRESS NOTES
"Subjective   Patient ID: Maia Tejada \"Priya\" is a 44 y.o. female who presents for No chief complaint on file..  HPI    Review of Systems    Objective   Physical Exam    Assessment/Plan            Kelli Link DO 10/16/24 12:29 PM   "

## 2024-12-12 ENCOUNTER — OFFICE VISIT (OUTPATIENT)
Dept: URGENT CARE | Age: 44
End: 2024-12-12
Payer: COMMERCIAL

## 2024-12-12 VITALS
RESPIRATION RATE: 16 BRPM | TEMPERATURE: 98.2 F | OXYGEN SATURATION: 97 % | HEART RATE: 67 BPM | DIASTOLIC BLOOD PRESSURE: 76 MMHG | SYSTOLIC BLOOD PRESSURE: 111 MMHG

## 2024-12-12 DIAGNOSIS — J01.00 ACUTE MAXILLARY SINUSITIS, RECURRENCE NOT SPECIFIED: ICD-10-CM

## 2024-12-12 DIAGNOSIS — R05.9 COUGH IN ADULT: Primary | ICD-10-CM

## 2024-12-12 LAB — POC SARS-COV-2 AG BINAX: NORMAL

## 2024-12-12 RX ORDER — AMOXICILLIN AND CLAVULANATE POTASSIUM 875; 125 MG/1; MG/1
875 TABLET, FILM COATED ORAL 2 TIMES DAILY
Qty: 20 TABLET | Refills: 0 | Status: SHIPPED | OUTPATIENT
Start: 2024-12-12

## 2024-12-12 ASSESSMENT — ENCOUNTER SYMPTOMS: COUGH: 1

## 2024-12-12 NOTE — PATIENT INSTRUCTIONS
Vicks under the nose can be helpful  Increase fluid intake; encourage electrolytes such as Pedialyte  10 deep breaths an hour  May use tylenol/NSAIDs as needed for fevers, chills, body aches.   May use warm compresses over sinuses

## 2024-12-12 NOTE — PROGRESS NOTES
"Subjective   Patient ID: Maia Tejada \"Priya\" is a 44 y.o. female. They present today with a chief complaint of Cough ( Pt. Stated has been coughing for ten days and having headaches and ears are bothersome and throat has been hurting also.).    History of Present Illness    Cough    44-year-old patient presents to clinic with complaints of cough with associated headaches, sore throat, sinus congestion, nasal congestion, sinus pain, bilateral ear fullness, purulent rhinorrhea, bad taste in the back of the throat which initially started about 10 days ago with noted dizziness which has improved but sinus symptoms have progressively worsened.  Reports has tried over-the-counter medications without relief.  Reports daughter had mono. Denies shortness of breath, chest pain, fevers, chills, body aches, nausea, vomiting, abdominal pain, diarrhea.       Past Medical History  Allergies as of 2024 - Reviewed 2024   Allergen Reaction Noted    Gluten GI Upset 2024       (Not in a hospital admission)       Past Medical History:   Diagnosis Date    Anxiety disorder, unspecified 2016    Anxiety    Encounter for gynecological examination (general) (routine) without abnormal findings     Pap smear, as part of routine gynecological examination    GERD (gastroesophageal reflux disease)     Personal history of diseases of the skin and subcutaneous tissue 2018    History of dermatitis    Personal history of other complications of pregnancy, childbirth and the puerperium     History of spontaneous     Personal history of other diseases of the respiratory system 2022    History of sinusitis    Personal history of other infectious and parasitic diseases     History of varicella    Personal history of other specified conditions     History of heartburn       Past Surgical History:   Procedure Laterality Date    DILATION AND CURETTAGE OF UTERUS  2017    Dilation And Curettage    MOUTH " SURGERY  05/04/2016    Oral Surgery Tooth Extraction        reports that she quit smoking about 3 years ago. Her smoking use included cigarettes. She has been exposed to tobacco smoke. She has never used smokeless tobacco. Alcohol use questions deferred to the physician. Drug use questions deferred to the physician.    Review of Systems  ROS negative with the exception as noted on HPI                                Objective    Vitals:    12/12/24 0819   BP: 111/76   BP Location: Left arm   Patient Position: Sitting   BP Cuff Size: Adult   Pulse: 67   Resp: 16   Temp: 36.8 °C (98.2 °F)   TempSrc: Oral   SpO2: 97%     Patient's last menstrual period was 11/28/2024.    Physical Exam  Constitutional:       Appearance: Normal appearance.   HENT:      Head: Normocephalic and atraumatic.      Right Ear: Ear canal and external ear normal. A middle ear effusion is present.      Left Ear: Tympanic membrane, ear canal and external ear normal.      Nose: Mucosal edema (and erythema) present. No rhinorrhea.      Right Sinus: Maxillary sinus tenderness present. No frontal sinus tenderness.      Left Sinus: Maxillary sinus tenderness present. No frontal sinus tenderness.      Mouth/Throat:      Lips: Pink.      Mouth: Mucous membranes are moist. No oral lesions.      Dentition: Normal dentition. No gingival swelling.      Tongue: No lesions. Tongue does not deviate from midline.      Palate: No mass and lesions.      Pharynx: Postnasal drip present. No pharyngeal swelling, posterior oropharyngeal erythema or uvula swelling.   Cardiovascular:      Rate and Rhythm: Normal rate and regular rhythm.      Heart sounds: No murmur heard.  Pulmonary:      Effort: Pulmonary effort is normal. No respiratory distress.      Breath sounds: Normal breath sounds. No stridor. No wheezing, rhonchi or rales.   Lymphadenopathy:      Cervical: Cervical adenopathy present.   Neurological:      Mental Status: She is alert.         Procedures    Point  of Care Test & Imaging Results from this visit  Results for orders placed or performed in visit on 12/12/24   POCT Covid-19 Rapid Antigen   Result Value Ref Range    POC UNIQUE-COV-2 AG  Presumptive negative test for SARS-CoV-2 (no antigen detected)     Presumptive negative test for SARS-CoV-2 (no antigen detected)      No results found.    Diagnostic study results (if any) were reviewed by Daniela Resendiz PA-C.    Assessment/Plan   Allergies, medications, history, and pertinent labs/EKGs/Imaging reviewed by Daniela Resendiz PA-C.   cough with associated headaches, sore throat, sinus congestion, nasal congestion, sinus pain, bilateral ear fullness, purulent rhinorrhea, bad taste in the back of the throat which initially started about 10 days ago with noted dizziness which has improved but sinus symptoms have progressively worsened. Advised to drink plenty of fluids, run a cool-mist humidifier in room at night, and get plenty of rest. Patient should avoid over-exertion and reduce exposure to irritants such as smoke, cold, dry air, and dust. Patient may take acetaminophen or ibuprofen as directed to reduce fever, pain, chills, and body aches. Antihistamine and decongestant usage was discussed and recommendations made. May also try warm compresses over the sinuses. Risk, benefits, and potential side effects of medication(s) discussed with pt. Discussed disease/illness presentation, treatment options, progression, complications, and outcomes with patient. Pt. Has expressed understanding and is an agreement of plan of care.      Medical Decision Making      Orders and Diagnoses  Diagnoses and all orders for this visit:  Cough in adult  -     POCT Covid-19 Rapid Antigen  Acute maxillary sinusitis, recurrence not specified  -     amoxicillin-pot clavulanate (Augmentin) 875-125 mg tablet; Take 1 tablet (875 mg) by mouth 2 times a day.      Medical Admin Record      Patient disposition: Home    Electronically signed by  Daniela Resendiz PA-C  9:05 AM

## 2025-01-11 ENCOUNTER — APPOINTMENT (OUTPATIENT)
Dept: SLEEP MEDICINE | Facility: CLINIC | Age: 45
End: 2025-01-11
Payer: COMMERCIAL

## 2025-01-17 ENCOUNTER — APPOINTMENT (OUTPATIENT)
Dept: GASTROENTEROLOGY | Facility: CLINIC | Age: 45
End: 2025-01-17
Payer: COMMERCIAL

## 2025-01-28 ENCOUNTER — PATIENT MESSAGE (OUTPATIENT)
Dept: PRIMARY CARE | Facility: CLINIC | Age: 45
End: 2025-01-28
Payer: COMMERCIAL

## 2025-01-28 DIAGNOSIS — F41.9 ANXIETY: Chronic | ICD-10-CM

## 2025-01-28 RX ORDER — ESCITALOPRAM OXALATE 20 MG/1
20 TABLET ORAL DAILY
Qty: 30 TABLET | Refills: 1 | Status: SHIPPED | OUTPATIENT
Start: 2025-01-28

## 2025-02-26 DIAGNOSIS — F41.9 ANXIETY: Chronic | ICD-10-CM

## 2025-02-26 RX ORDER — ESCITALOPRAM OXALATE 20 MG/1
20 TABLET ORAL DAILY
Qty: 90 TABLET | Refills: 3 | Status: SHIPPED | OUTPATIENT
Start: 2025-02-26

## 2025-03-03 ENCOUNTER — TELEPHONE (OUTPATIENT)
Dept: GASTROENTEROLOGY | Facility: CLINIC | Age: 45
End: 2025-03-03
Payer: COMMERCIAL

## 2025-03-03 NOTE — TELEPHONE ENCOUNTER
Call to patient. Per Dr Henderson, Relayed to her to start senna 2 tablets daily, increase to twice a day if needed.  Also start Gas-X over-the-counter 3 or 4 times a day.  Advised her to follow-up with her primary doctor to make sure there are no other causes for her chest pain especially that she was a smoker, if any persistent new or worsening symptoms she needs to go to the emergency room because of the chest pain. Pt states she quit smoking in June. She voiced understanding.

## 2025-03-14 ENCOUNTER — OFFICE VISIT (OUTPATIENT)
Dept: URGENT CARE | Age: 45
End: 2025-03-14
Payer: COMMERCIAL

## 2025-03-14 VITALS
HEIGHT: 63 IN | SYSTOLIC BLOOD PRESSURE: 110 MMHG | WEIGHT: 182 LBS | OXYGEN SATURATION: 96 % | DIASTOLIC BLOOD PRESSURE: 78 MMHG | TEMPERATURE: 98.1 F | RESPIRATION RATE: 18 BRPM | HEART RATE: 78 BPM | BODY MASS INDEX: 32.25 KG/M2

## 2025-03-14 DIAGNOSIS — J06.9 UPPER RESPIRATORY TRACT INFECTION, UNSPECIFIED TYPE: Primary | ICD-10-CM

## 2025-03-14 RX ORDER — AZITHROMYCIN 250 MG/1
TABLET, FILM COATED ORAL
Qty: 6 TABLET | Refills: 0 | Status: SHIPPED | OUTPATIENT
Start: 2025-03-14 | End: 2025-03-19

## 2025-03-14 RX ORDER — METHYLPREDNISOLONE 4 MG/1
TABLET ORAL
Qty: 21 TABLET | Refills: 0 | Status: SHIPPED | OUTPATIENT
Start: 2025-03-14 | End: 2025-03-20

## 2025-03-14 ASSESSMENT — ENCOUNTER SYMPTOMS
CARDIOVASCULAR NEGATIVE: 1
SINUS PAIN: 1
COUGH: 0
FEVER: 0
SINUS PRESSURE: 1
SINUS COMPLAINT: 1

## 2025-03-14 ASSESSMENT — PATIENT HEALTH QUESTIONNAIRE - PHQ9
SUM OF ALL RESPONSES TO PHQ9 QUESTIONS 1 AND 2: 0
1. LITTLE INTEREST OR PLEASURE IN DOING THINGS: NOT AT ALL
2. FEELING DOWN, DEPRESSED OR HOPELESS: NOT AT ALL

## 2025-03-14 NOTE — PROGRESS NOTES
"Subjective   Patient ID: Maia Tejada \"Priya\" is a 44 y.o. female. They present today with a chief complaint of Ear Problem (Pt c/o pounding in left ear) and Sinus Problem (Sinus pain x 4 days).    History of Present Illness    Sinus Problem  Associated symptoms: congestion and ear pain    Associated symptoms: no cough and no fever      Patient presents to urgent care for a chief complaint of concern of left earache, sinus pressure and congestion has had negative home COVID and flu test symptoms have been ongoing since Monday no recorded fevers no report of respiratory distress no report of vomiting or diarrhea    Past Medical History  Allergies as of 2025 - Reviewed 2025   Allergen Reaction Noted    Gluten GI Upset 2024       (Not in a hospital admission)       Past Medical History:   Diagnosis Date    Anxiety disorder, unspecified 2016    Anxiety    Encounter for gynecological examination (general) (routine) without abnormal findings     Pap smear, as part of routine gynecological examination    GERD (gastroesophageal reflux disease)     Personal history of diseases of the skin and subcutaneous tissue 2018    History of dermatitis    Personal history of other complications of pregnancy, childbirth and the puerperium     History of spontaneous     Personal history of other diseases of the respiratory system 2022    History of sinusitis    Personal history of other infectious and parasitic diseases     History of varicella    Personal history of other specified conditions     History of heartburn       Past Surgical History:   Procedure Laterality Date    DILATION AND CURETTAGE OF UTERUS  2017    Dilation And Curettage    MOUTH SURGERY  2016    Oral Surgery Tooth Extraction        reports that she quit smoking about 4 years ago. Her smoking use included cigarettes. She has been exposed to tobacco smoke. She has never used smokeless tobacco. Alcohol use " "questions deferred to the physician. Drug use questions deferred to the physician.    Review of Systems  Review of Systems   Constitutional:  Negative for fever.   HENT:  Positive for congestion, ear pain, sinus pressure and sinus pain. Negative for ear discharge.    Respiratory:  Negative for cough.    Cardiovascular: Negative.                                   Objective    Vitals:    03/14/25 0813   BP: 110/78   Pulse: 78   Resp: 18   Temp: 36.7 °C (98.1 °F)   SpO2: 96%   Weight: 82.6 kg (182 lb)   Height: 1.6 m (5' 3\")     No LMP recorded.    Physical Exam  Vitals and nursing note reviewed.   Constitutional:       Appearance: Normal appearance.   HENT:      Head: Normocephalic and atraumatic.      Right Ear: Tympanic membrane normal. There is no impacted cerumen.      Left Ear: Tympanic membrane normal. There is no impacted cerumen.      Nose: Rhinorrhea present.      Mouth/Throat:      Mouth: Mucous membranes are moist.   Cardiovascular:      Rate and Rhythm: Normal rate and regular rhythm.   Pulmonary:      Effort: Pulmonary effort is normal. No respiratory distress.      Breath sounds: Normal breath sounds.   Neurological:      General: No focal deficit present.      Mental Status: She is alert and oriented to person, place, and time.   Psychiatric:         Mood and Affect: Mood normal.         Behavior: Behavior normal.         Procedures    Point of Care Test & Imaging Results from this visit  No results found for this visit on 03/14/25.   No results found.    Diagnostic study results (if any) were reviewed by Tyler Roy PA-C.    Assessment/Plan   Allergies, medications, history, and pertinent labs/EKGs/Imaging reviewed by Tyelr Roy PA-C.     Medical Decision Making  Patient replaced on Medrol Dosepak and azithromycin, I did discuss with patient avoiding NSAIDs such as ibuprofen Advil Aleve or Motrin may take Tylenol, if no resolution regression of symptoms in 7 to 10 days may return to " urgent care for evaluation or follow-up with primary care.  Patient verbalized understanding agreeable to plan discharge emergent care A+O x 4 stable condition no signs of distress    Orders and Diagnoses  Diagnoses and all orders for this visit:  Upper respiratory tract infection, unspecified type  -     methylPREDNISolone (Medrol Dospak) 4 mg tablets; Follow schedule on package instructions  -     azithromycin (Zithromax) 250 mg tablet; Take 2 tabs (500 mg) by mouth today, then take 1 tab daily for 4 days.      Medical Admin Record      Patient disposition: Home    Electronically signed by Tyler Roy PA-C  8:31 AM

## 2025-03-31 ENCOUNTER — APPOINTMENT (OUTPATIENT)
Dept: GASTROENTEROLOGY | Facility: CLINIC | Age: 45
End: 2025-03-31
Payer: COMMERCIAL

## 2025-03-31 VITALS
HEART RATE: 66 BPM | SYSTOLIC BLOOD PRESSURE: 114 MMHG | DIASTOLIC BLOOD PRESSURE: 76 MMHG | HEIGHT: 63 IN | WEIGHT: 191 LBS | BODY MASS INDEX: 33.84 KG/M2

## 2025-03-31 DIAGNOSIS — K58.9 IRRITABLE BOWEL SYNDROME, UNSPECIFIED TYPE: ICD-10-CM

## 2025-03-31 DIAGNOSIS — K44.9 HIATAL HERNIA: ICD-10-CM

## 2025-03-31 DIAGNOSIS — Z11.59 ENCOUNTER FOR SCREENING FOR OTHER VIRAL DISEASES: Primary | ICD-10-CM

## 2025-03-31 DIAGNOSIS — Z12.11 COLON CANCER SCREENING: ICD-10-CM

## 2025-03-31 DIAGNOSIS — R09.A2 GLOBUS SENSATION: ICD-10-CM

## 2025-03-31 DIAGNOSIS — R12 HEARTBURN: ICD-10-CM

## 2025-03-31 DIAGNOSIS — Z12.11 SPECIAL SCREENING FOR MALIGNANT NEOPLASM OF COLON: ICD-10-CM

## 2025-03-31 PROCEDURE — 3008F BODY MASS INDEX DOCD: CPT | Performed by: STUDENT IN AN ORGANIZED HEALTH CARE EDUCATION/TRAINING PROGRAM

## 2025-03-31 PROCEDURE — 99215 OFFICE O/P EST HI 40 MIN: CPT | Performed by: STUDENT IN AN ORGANIZED HEALTH CARE EDUCATION/TRAINING PROGRAM

## 2025-03-31 PROCEDURE — 1036F TOBACCO NON-USER: CPT | Performed by: STUDENT IN AN ORGANIZED HEALTH CARE EDUCATION/TRAINING PROGRAM

## 2025-03-31 RX ORDER — ESOMEPRAZOLE MAGNESIUM 40 MG/1
40 CAPSULE, DELAYED RELEASE ORAL
Qty: 60 CAPSULE | Refills: 11 | Status: SHIPPED | OUTPATIENT
Start: 2025-03-31 | End: 2026-03-31

## 2025-03-31 RX ORDER — HYDROGEN PEROXIDE 3 %
SOLUTION, NON-ORAL MISCELLANEOUS
COMMUNITY
Start: 2024-10-15 | End: 2025-03-31 | Stop reason: WASHOUT

## 2025-03-31 RX ORDER — FAMOTIDINE 20 MG/1
20 TABLET, FILM COATED ORAL NIGHTLY
COMMUNITY

## 2025-03-31 NOTE — PATIENT INSTRUCTIONS
1-for the heartburn please continue Nexium 40 mg twice a day half an hour before food, In order to prevent acid reflux: please elevate the head of the bed 6 to 8 inches, have an early dinner at least 3 hours before sleep, have small frequent meals (3-5 small meals per day), avoid lying down after meals, avoid spices, juices, soda, tomatoes, hoang, oranges, caffeine, other caffeinated beverages, chocolate, alcohol, NSAIDs (such as Advil Aleve ibuprofen naproxen Motrin Mobic Excedrin among others), smoking, fatty foods, peppermint among others  2-to help with your bowel movements please continue with senna 2 tablets at night, we can increase to 2 tablets twice a day if needed, he can keep taking Gas-X over-the-counter 3 or 4 times a day, increase water intake fiber intake and physical activity as tolerated

## 2025-03-31 NOTE — PROGRESS NOTES
8/2023  43-year-old lady with history of anxiety, dyslipidemia, thyromegaly, GERD for over 20 years presenting with multiple GI complaints. The patient mentions having chronic feeling of tightness in her throat happening mainly after solid food not with liquids, occurs at the same time with heartburn, lasts for hours, gets better on its own, sometimes not related to food as well. It can happen with acid and nonacid food. Her primary doctor put her on Nexium which helped with the heartburn but not with the tightness. She also complains of chronic abdominal bloating with incomplete bowel emptying and 4 soft back-to-back bowel movements, mention prior constipation with 1 bowel movement every few days with straining. She mentioned that she was placed on Synthroid but currently stopped it.     12/4/2023  Patient is here for follow-up, status post EGD and colonoscopy as below, felt better after colonoscopy, denies any dysphagia but mentioned that she is aware of her swallowing    3/31/2025  Patient is here for follow-up, she was able to stop smoking completely, heartburn is significantly better on Nexium twice a day, mentions significant abdominal bloating and gas and rumbling in her stomach, sometimes going 5 days without a bowel movement, otherwise  incomplete bowel emptying    EGD 7 years ago showing heartburn hiatal hernia esophageal ulcer as per her  EGD 10/2023 small hiatal hernia, gastritis, biopsy of the esophagus stomach and duodenum unremarkable  Colonoscopy 7 years ago showing hemorrhoids as per her  Colonoscopy 10/2023 hemorrhoids, good prep, repeat in 10 years, biopsy unremarkable  Family history reviewed, not pertinent to chief complaint  Bowel movements as above  Denies NSAIDs, social alcohol use, denies marijuana drug use, she stopped smoking in June 2024                The note was created using voice recognition transcription software. Despite proofreading, unintentional typographical errors may  be present. Please contact the GI office with any questions or concerns.     Current Medications: reviewed    A 10 point review of system is negative except for what is mentioned in the HPI      Follow up with primary provider was advised for non GI symptoms and findings    Follow up with GI was advised     Vital Signs: Reviewed    Physical Exam:  General: no apparent distress, pleasant and cooperative  Skin:  Warm and dry, no jaundice  HEENT: No scleral icterus, no conjunctival pallor, normocephalic, atraumatic, mucous membranes moist  Neck:  atraumatic, trachea midline, no JVD  Chest:  decreased air entry to auscultation bilaterally. No wheezes, rales, or rhonchi  CV:  Regular rate and rhythm.  Positive S1/S2  Abdomen: no distension, +BS, soft, non-tender to palpation, no rebound tenderness, no guarding, no rigidity, no discernible ascites   Extremities: lower extremity edema, Chronic pigmentary changes, no cyanosis  Neurological:  A&Ox3 , no asterixis  Psychiatric: cooperative     Investigations:  Labs, radiological imaging and cardiac work up were reviewed        1-hepatitis C antibody - 2/2024     2-BMI 33 down from 37, healthy lifestyle advised     3-Healthcare maintenance, colonoscopy as above, repeat in 2033 after 10 days of twice daily MiraLAX, note that patient felt better after last colonoscopy      4-heartburn and globus, patient mentioned that she has burping and bloating rather than dysphagia, history of thyromegaly, CT coronary 7/2023 showing small hiatal hernia see report, x-ray esophagogram 8/2023 unremarkable, status post EGD 10/2023 as above showing small hiatal hernia, heartburn is significantly better on Nexium twice a day, can consider esophageal motility and pH study if needed, advised to avoid stress, suspect additional functional component     5-incomplete bowel emptying, felt better after colonoscopy, start senna 2 tablets daily, can increase as needed, Gas-X as needed, lifestyle changes  advised

## 2025-05-07 ENCOUNTER — OFFICE VISIT (OUTPATIENT)
Dept: PRIMARY CARE | Facility: CLINIC | Age: 45
End: 2025-05-07
Payer: COMMERCIAL

## 2025-05-07 VITALS
TEMPERATURE: 99.2 F | BODY MASS INDEX: 34.55 KG/M2 | DIASTOLIC BLOOD PRESSURE: 68 MMHG | SYSTOLIC BLOOD PRESSURE: 110 MMHG | HEIGHT: 63 IN | WEIGHT: 195 LBS | HEART RATE: 84 BPM

## 2025-05-07 DIAGNOSIS — R50.9 FEVER, UNSPECIFIED FEVER CAUSE: ICD-10-CM

## 2025-05-07 DIAGNOSIS — R31.29 MICROSCOPIC HEMATURIA: ICD-10-CM

## 2025-05-07 DIAGNOSIS — M54.50 ACUTE BILATERAL LOW BACK PAIN WITHOUT SCIATICA: Primary | ICD-10-CM

## 2025-05-07 DIAGNOSIS — M54.42 ACUTE LEFT-SIDED LOW BACK PAIN WITH LEFT-SIDED SCIATICA: ICD-10-CM

## 2025-05-07 LAB
APPEARANCE UR: CLEAR
BILIRUB UR QL STRIP: NEGATIVE
COLOR UR: YELLOW
GLUCOSE UR STRIP-MCNC: NEGATIVE MG/DL
HGB UR QL STRIP: ABNORMAL
KETONES UR STRIP-MCNC: NEGATIVE MG/DL
LEUKOCYTE ESTERASE UR QL STRIP: NEGATIVE
NITRITE UR QL STRIP: NEGATIVE
PH UR STRIP: 6.5 [PH]
PROT UR STRIP-MCNC: NEGATIVE MG/DL
SP GR UR STRIP.AUTO: 1.01
UROBILINOGEN UR STRIP-ACNC: 0.2 E.U./DL

## 2025-05-07 PROCEDURE — 99214 OFFICE O/P EST MOD 30 MIN: CPT | Performed by: STUDENT IN AN ORGANIZED HEALTH CARE EDUCATION/TRAINING PROGRAM

## 2025-05-07 PROCEDURE — 81003 URINALYSIS AUTO W/O SCOPE: CPT | Performed by: STUDENT IN AN ORGANIZED HEALTH CARE EDUCATION/TRAINING PROGRAM

## 2025-05-07 PROCEDURE — 1036F TOBACCO NON-USER: CPT | Performed by: STUDENT IN AN ORGANIZED HEALTH CARE EDUCATION/TRAINING PROGRAM

## 2025-05-07 PROCEDURE — 3008F BODY MASS INDEX DOCD: CPT | Performed by: STUDENT IN AN ORGANIZED HEALTH CARE EDUCATION/TRAINING PROGRAM

## 2025-05-07 NOTE — PROGRESS NOTES
"Subjective   Patient ID: Maia Tejada \"Priya\" is a 45 y.o. female who presents for Back Pain (Low back pain and fevers , some leg pain /Her fever went to 101.2 on Monday /Covid and flu negative . Has been taking tylenol- last dose was at 10:30 today).  History of Present Illness  Maia Tejada \"Nargis" is a 45 year old female who presents with fever, headache, and back pain.    She began experiencing symptoms with a mild headache, followed by fever and body aches. The pain is most pronounced in the mid-back and on the side. She experiences fluctuations in body temperature, feeling alternately cold and hot, with intermittent sweating and chills. Tylenol alleviates her symptoms, including reducing her fever.    No coughing, sinus issues, sore throat, diarrhea, or significant gastrointestinal symptoms, though she reports mild nausea without vomiting. She experiences dizziness when standing up quickly or leaning over. Her appetite has decreased, and she has been unable to finish meals, managing only a protein bar and a small lunch on the day of the visit.    She has been urinating frequently but is unsure if it is more than usual. A urine sample taken during the visit showed microscopic hematuria but no bacteria. She has no history of kidney stones and has not experienced similar symptoms in the past.    She has not traveled recently, with her last trip being to [place] in February. She recently finished her menstrual period the day before the visit. She has no known medication allergies but is gluten intolerant. She drinks only water and reports staying well-hydrated.    Review of Systems  ROS otherwise negative aside from what was mentioned above in HPI.    Objective     /68 (BP Location: Left arm, Patient Position: Sitting, BP Cuff Size: Adult)   Pulse 84   Temp 37.3 °C (99.2 °F)   Ht 1.6 m (5' 3\")   Wt 88.5 kg (195 lb)   BMI 34.54 kg/m²      Current Outpatient Medications   Medication " Instructions    ALPRAZolam (XANAX) 0.25 mg, oral, Daily    amoxicillin-pot clavulanate (Augmentin) 875-125 mg tablet 875 mg, oral, 2 times daily    ergocalciferol (VITAMIN D-2) 50,000 Units, oral, Weekly    escitalopram (LEXAPRO) 20 mg, oral, Daily    esomeprazole (NEXIUM) 40 mg, oral, 2 times daily before meals, Do not open capsule.    famotidine (PEPCID) 20 mg, Nightly    multivitamin with minerals (multivitamin-iron-folic acid) tablet 1 tablet, oral, Daily       Physical Exam  VITALS: T- 99.2  GENERAL: Alert, cooperative, well developed, no acute distress.  HEENT: Normocephalic, normal oropharynx, moist mucous membranes.  CHEST: Clear to auscultation bilaterally, no wheezes, rhonchi, or crackles.  CV: Normal heart rate and rhythm, S1 and S2 normal without murmurs.  ABDOMEN: Soft, non-tender, non-distended. No CVA tenderness  SKIN: No rashes or lesions.      Results  LABS  Urinalysis: trace lysed red blood cells, no bacteria (05/07/2025)    Assessment & Plan  Fever and back pain  Persistent fever and significant mid-back pain, exacerbated when lying flat, with differential diagnosis of kidney infection or viral infection. Fever reduces with acetaminophen but remains elevated and then recurs. No gastrointestinal or respiratory symptoms. Urine dip shows microscopic hematuria without bacteria, suggesting a possible kidney issue rather than a bladder infection. No recent travel or illness exposure.  - Order urine culture to investigate potential infection.  - Order ultrasound to differentiate between infection, stone, or other causes.  - Order CBC and metabolic panel to assess for infection and kidney function.  - Advise continuation of acetaminophen for fever and pain management, with the option to alternate with ibuprofen if needed.  - Instruct to seek emergency care if experiencing severe pain or inability to eat or drink    Microscopic hematuria  Microscopic hematuria on urine dipstick, possibly related to recent  menstruation or indicative of a kidney issue such as infection or stone. No history of kidney stones.  - Monitor urine culture results for infection.  - Evaluate ultrasound results for potential kidney stones or other abnormalities.    Nausea  Mild nausea without vomiting, possibly related to viral infection or secondary to fever and back pain.  - Manage with hydration.    Dizziness  Dizziness upon standing quickly or leaning over, possibly related to fever or dehydration. No respiratory or cardiac symptoms.  - Advise maintaining hydration and monitor symptoms.    Decreased appetite  Decreased appetite, possibly related to fever and general malaise. No significant weight loss.  - Encourage small, frequent meals as tolerated.    Follow up as needed    Kelli Link,      This medical note was created with the assistance of artificial intelligence (AI) for documentation purposes. The content has been reviewed and confirmed by the healthcare provider for accuracy and completeness. Patient consented to the use of audio recording and use of AI during their visit.

## 2025-05-08 DIAGNOSIS — D72.810 LYMPHOPENIA: Primary | ICD-10-CM

## 2025-05-08 DIAGNOSIS — R50.9 FEVER, UNSPECIFIED FEVER CAUSE: ICD-10-CM

## 2025-05-08 LAB
ALBUMIN SERPL-MCNC: 4.3 G/DL (ref 3.6–5.1)
ALP SERPL-CCNC: 70 U/L (ref 31–125)
ALT SERPL-CCNC: 44 U/L (ref 6–29)
ANION GAP SERPL CALCULATED.4IONS-SCNC: 9 MMOL/L (CALC) (ref 7–17)
AST SERPL-CCNC: 41 U/L (ref 10–35)
BASOPHILS # BLD AUTO: 11 CELLS/UL (ref 0–200)
BASOPHILS NFR BLD AUTO: 0.4 %
BILIRUB SERPL-MCNC: 0.3 MG/DL (ref 0.2–1.2)
BUN SERPL-MCNC: 12 MG/DL (ref 7–25)
CALCIUM SERPL-MCNC: 8.6 MG/DL (ref 8.6–10.2)
CHLORIDE SERPL-SCNC: 101 MMOL/L (ref 98–110)
CO2 SERPL-SCNC: 27 MMOL/L (ref 20–32)
CREAT SERPL-MCNC: 0.78 MG/DL (ref 0.5–0.99)
EGFRCR SERPLBLD CKD-EPI 2021: 95 ML/MIN/1.73M2
EOSINOPHIL # BLD AUTO: 11 CELLS/UL (ref 15–500)
EOSINOPHIL NFR BLD AUTO: 0.4 %
ERYTHROCYTE [DISTWIDTH] IN BLOOD BY AUTOMATED COUNT: 13 % (ref 11–15)
GLUCOSE SERPL-MCNC: 99 MG/DL (ref 65–99)
HCT VFR BLD AUTO: 39.2 % (ref 35–45)
HGB BLD-MCNC: 13 G/DL (ref 11.7–15.5)
LYMPHOCYTES # BLD AUTO: 504 CELLS/UL (ref 850–3900)
LYMPHOCYTES NFR BLD AUTO: 18 %
MCH RBC QN AUTO: 30 PG (ref 27–33)
MCHC RBC AUTO-ENTMCNC: 33.2 G/DL (ref 32–36)
MCV RBC AUTO: 90.5 FL (ref 80–100)
MONOCYTES # BLD AUTO: 356 CELLS/UL (ref 200–950)
MONOCYTES NFR BLD AUTO: 12.7 %
NEUTROPHILS # BLD AUTO: 1918 CELLS/UL (ref 1500–7800)
NEUTROPHILS NFR BLD AUTO: 68.5 %
PLATELET # BLD AUTO: 154 THOUSAND/UL (ref 140–400)
PMV BLD REES-ECKER: 10.6 FL (ref 7.5–12.5)
POTASSIUM SERPL-SCNC: 4.2 MMOL/L (ref 3.5–5.3)
PROT SERPL-MCNC: 7.1 G/DL (ref 6.1–8.1)
RBC # BLD AUTO: 4.33 MILLION/UL (ref 3.8–5.1)
SODIUM SERPL-SCNC: 137 MMOL/L (ref 135–146)
WBC # BLD AUTO: 2.8 THOUSAND/UL (ref 3.8–10.8)

## 2025-05-09 ENCOUNTER — HOSPITAL ENCOUNTER (OUTPATIENT)
Dept: RADIOLOGY | Facility: HOSPITAL | Age: 45
Discharge: HOME | End: 2025-05-09
Payer: COMMERCIAL

## 2025-05-09 DIAGNOSIS — R50.9 FEVER, UNSPECIFIED FEVER CAUSE: ICD-10-CM

## 2025-05-09 DIAGNOSIS — M54.50 ACUTE BILATERAL LOW BACK PAIN WITHOUT SCIATICA: ICD-10-CM

## 2025-05-09 LAB
BACTERIA UR CULT: NORMAL
EBV NA IGG SER IA-ACNC: 94.7 U/ML
EBV VCA IGG SER IA-ACNC: >750 U/ML
EBV VCA IGM SER IA-ACNC: 52.4 U/ML
QUEST EBV PANEL INTERPRETATION:: ABNORMAL

## 2025-05-09 PROCEDURE — 76770 US EXAM ABDO BACK WALL COMP: CPT

## 2025-05-09 PROCEDURE — 76770 US EXAM ABDO BACK WALL COMP: CPT | Performed by: RADIOLOGY

## 2025-09-11 ENCOUNTER — APPOINTMENT (OUTPATIENT)
Dept: PRIMARY CARE | Facility: CLINIC | Age: 45
End: 2025-09-11
Payer: COMMERCIAL